# Patient Record
Sex: MALE | Race: WHITE | NOT HISPANIC OR LATINO | Employment: UNEMPLOYED | URBAN - METROPOLITAN AREA
[De-identification: names, ages, dates, MRNs, and addresses within clinical notes are randomized per-mention and may not be internally consistent; named-entity substitution may affect disease eponyms.]

---

## 2019-10-21 ENCOUNTER — HOSPITAL ENCOUNTER (EMERGENCY)
Facility: HOSPITAL | Age: 29
Discharge: HOME | End: 2019-10-21
Attending: EMERGENCY MEDICINE | Admitting: EMERGENCY MEDICINE
Payer: COMMERCIAL

## 2019-10-21 VITALS
HEART RATE: 100 BPM | TEMPERATURE: 97.9 F | SYSTOLIC BLOOD PRESSURE: 122 MMHG | OXYGEN SATURATION: 98 % | RESPIRATION RATE: 22 BRPM | DIASTOLIC BLOOD PRESSURE: 60 MMHG

## 2019-10-21 DIAGNOSIS — K08.89 TOOTHACHE: ICD-10-CM

## 2019-10-21 DIAGNOSIS — K04.7 DENTAL ABSCESS: Primary | ICD-10-CM

## 2019-10-21 PROCEDURE — 99282 EMERGENCY DEPT VISIT SF MDM: CPT

## 2019-10-21 PROCEDURE — 99284 EMERGENCY DEPT VISIT MOD MDM: CPT | Performed by: EMERGENCY MEDICINE

## 2019-10-21 PROCEDURE — 96372 THER/PROPH/DIAG INJ SC/IM: CPT

## 2019-10-21 RX ORDER — SENNOSIDES 8.6 MG
650 CAPSULE ORAL EVERY 6 HOURS PRN
Qty: 30 TABLET | Refills: 0 | Status: SHIPPED | OUTPATIENT
Start: 2019-10-21

## 2019-10-21 RX ORDER — TRAMADOL HYDROCHLORIDE 50 MG/1
50 TABLET ORAL EVERY 8 HOURS PRN
Qty: 5 TABLET | Refills: 0 | Status: SHIPPED | OUTPATIENT
Start: 2019-10-21 | End: 2019-10-23

## 2019-10-21 RX ORDER — KETOROLAC TROMETHAMINE 30 MG/ML
30 INJECTION, SOLUTION INTRAMUSCULAR; INTRAVENOUS ONCE
Status: COMPLETED | OUTPATIENT
Start: 2019-10-21 | End: 2019-10-21

## 2019-10-21 RX ORDER — PAROXETINE HYDROCHLORIDE 20 MG/1
20 TABLET, FILM COATED ORAL DAILY
COMMUNITY

## 2019-10-21 RX ORDER — ACETAMINOPHEN 325 MG/1
650 TABLET ORAL ONCE
Status: COMPLETED | OUTPATIENT
Start: 2019-10-21 | End: 2019-10-21

## 2019-10-21 RX ORDER — PENICILLIN V POTASSIUM 250 MG/1
500 TABLET ORAL ONCE
Status: COMPLETED | OUTPATIENT
Start: 2019-10-21 | End: 2019-10-21

## 2019-10-21 RX ORDER — PENICILLIN V POTASSIUM 500 MG/1
500 TABLET ORAL 4 TIMES DAILY
Qty: 40 TABLET | Refills: 0 | Status: SHIPPED | OUTPATIENT
Start: 2019-10-21 | End: 2019-10-31

## 2019-10-21 RX ORDER — IBUPROFEN 800 MG/1
800 TABLET ORAL EVERY 8 HOURS PRN
Qty: 30 TABLET | Refills: 0 | Status: SHIPPED | OUTPATIENT
Start: 2019-10-21 | End: 2019-10-31

## 2019-10-21 RX ORDER — TRAMADOL HYDROCHLORIDE 50 MG/1
50 TABLET ORAL ONCE
Status: COMPLETED | OUTPATIENT
Start: 2019-10-21 | End: 2019-10-21

## 2019-10-21 RX ADMIN — TRAMADOL HYDROCHLORIDE 50 MG: 50 TABLET, FILM COATED ORAL at 02:24

## 2019-10-21 RX ADMIN — KETOROLAC TROMETHAMINE 30 MG: 30 INJECTION, SOLUTION INTRAMUSCULAR at 02:25

## 2019-10-21 RX ADMIN — ACETAMINOPHEN 650 MG: 325 TABLET ORAL at 02:24

## 2019-10-21 RX ADMIN — PENICILLIN V POTASSIUM 500 MG: 250 TABLET, FILM COATED ORAL at 02:23

## 2019-10-21 NOTE — ED PROVIDER NOTES
History  Chief Complaint   Patient presents with    Dental Pain     right upper molar pain  Last dose of tylenol at 2000  33 yo male with R upper molar dental pain x past 4 days  Pt states he knows he has dental caries but also felt tooth crack Thursday night and since that time has had worsening pain  Obvious dental abscess above R upper molar tooth #2  History provided by:  Patient   used: No    Dental Pain   Location:  Upper  Upper teeth location:  2/RU 2nd molar  Quality:  Aching and constant  Severity:  Severe  Onset quality:  Gradual  Duration:  4 days  Timing:  Constant  Progression:  Unchanged  Chronicity:  New  Context: poor dentition and trauma (cracked tooth while eating something 4 days ago)    Relieved by:  Nothing  Worsened by:  Pressure and touching  Ineffective treatments:  Acetaminophen  Associated symptoms: facial pain and gum swelling ("I feel a bump on it")    Associated symptoms: no congestion, no drooling, no facial swelling, no fever, no headaches, no neck pain and no trismus    Risk factors: lack of dental care        Prior to Admission Medications   Prescriptions Last Dose Informant Patient Reported? Taking? PARoxetine (PAXIL) 20 mg tablet   Yes Yes   Sig: Take 20 mg by mouth daily      Facility-Administered Medications: None       History reviewed  No pertinent past medical history  History reviewed  No pertinent surgical history  History reviewed  No pertinent family history  I have reviewed and agree with the history as documented  Social History     Tobacco Use    Smoking status: Current Every Day Smoker     Packs/day: 0 50    Smokeless tobacco: Never Used   Substance Use Topics    Alcohol use: Not Currently    Drug use: Not Currently        Review of Systems   Constitutional: Negative for chills and fever  HENT: Positive for dental problem  Negative for congestion, drooling, facial swelling and sore throat      Eyes: Negative for visual disturbance  Respiratory: Negative for shortness of breath and wheezing  Cardiovascular: Negative for chest pain and palpitations  Gastrointestinal: Negative for abdominal pain, diarrhea, nausea and vomiting  Genitourinary: Negative for dysuria  Musculoskeletal: Negative for neck pain and neck stiffness  Skin: Negative for pallor and rash  Neurological: Negative for headaches  Psychiatric/Behavioral: Negative for confusion  All other systems reviewed and are negative  Physical Exam  Physical Exam   Constitutional: He is oriented to person, place, and time  He appears well-developed and well-nourished  He appears distressed (uncomfortable)  HENT:   Head: Normocephalic and atraumatic  Mouth/Throat: Oropharynx is clear and moist        Eyes: Pupils are equal, round, and reactive to light  EOM are normal    Neck: Normal range of motion  Neck supple  Cardiovascular: Normal rate and regular rhythm  No murmur heard  Pulmonary/Chest: Effort normal and breath sounds normal    Abdominal: Soft  Bowel sounds are normal    Musculoskeletal: Normal range of motion  He exhibits no edema  Neurological: He is alert and oriented to person, place, and time  Skin: Skin is warm  No rash noted  No pallor  Psychiatric: He has a normal mood and affect  His behavior is normal    Nursing note and vitals reviewed        Vital Signs  ED Triage Vitals [10/21/19 0205]   Temperature Pulse Respirations Blood Pressure SpO2   97 9 °F (36 6 °C) 100 22 122/60 98 %      Temp Source Heart Rate Source Patient Position - Orthostatic VS BP Location FiO2 (%)   Temporal Monitor Sitting Right arm --      Pain Score       8           Vitals:    10/21/19 0205   BP: 122/60   Pulse: 100   Patient Position - Orthostatic VS: Sitting         Visual Acuity      ED Medications  Medications   ketorolac (TORADOL) injection 30 mg (30 mg Intramuscular Given 10/21/19 0225)   penicillin V potassium (VEETID) tablet 500 mg (500 mg Oral Given 10/21/19 0223)   acetaminophen (TYLENOL) tablet 650 mg (650 mg Oral Given 10/21/19 0224)   traMADol (ULTRAM) tablet 50 mg (50 mg Oral Given 10/21/19 0224)       Diagnostic Studies  Results Reviewed     None                 No orders to display              Procedures  Procedures       ED Course  ED Course as of Oct 21 0235   Mon Oct 21, 2019   0202 Pt seen and examined  33 yo male with R upper molar dental pain x past 4 days  Pt states he knows he has dental caries but also felt tooth crack Thursday night and since that time has had worsening pain  Obvious dental abscess above R upper molar tooth #2  Will give IM toradol, tylenol, ultram and PCN and have pt f/u with dental clinic  MDM    Disposition  Final diagnoses:   Dental abscess   Toothache     Time reflects when diagnosis was documented in both MDM as applicable and the Disposition within this note     Time User Action Codes Description Comment    10/21/2019  2:14 AM Lisa CRUZ Add [K04 7] Dental abscess     10/21/2019  2:14 AM Janis Mendez Add [K08 89] Toothache       ED Disposition     ED Disposition Condition Date/Time Comment    Discharge Stable Mon Oct 21, 2019  2:14 AM Tiffany Oneill discharge to home/self care              Follow-up Information     Follow up With Specialties Details Why [address]  Schedule an appointment as soon as possible for a visit   [place]  Schedule an appointment as soon as possible for a visit   [address] [place] [address]  [phone]          Discharge Medication List as of 10/21/2019  2:16 AM      START taking these medications    Details   acetaminophen (TYLENOL) 650 mg CR tablet Take 1 tablet (650 mg total) by mouth every 6 (six) hours as needed for mild pain or moderate pain, Starting Mon 10/21/2019, Print      ibuprofen (MOTRIN) 800 mg tablet Take 1 tablet (800 mg total) by mouth every 8 (eight) hours as needed for mild pain or moderate pain for up to 10 days, Starting Mon 10/21/2019, Until Thu 10/31/2019, Print      penicillin V potassium (VEETID) 500 mg tablet Take 1 tablet (500 mg total) by mouth 4 (four) times a day for 10 days, Starting Mon 10/21/2019, Until Thu 10/31/2019, Print      traMADol (ULTRAM) 50 mg tablet Take 1 tablet (50 mg total) by mouth every 8 (eight) hours as needed for severe pain for up to 2 days, Starting Mon 10/21/2019, Until Wed 10/23/2019, Print         CONTINUE these medications which have NOT CHANGED    Details   PARoxetine (PAXIL) 20 mg tablet Take 20 mg by mouth daily, Historical Med           No discharge procedures on file      ED Provider  Electronically Signed by           Jersey Segura DO  10/21/19 4821

## 2021-03-22 ENCOUNTER — OFFICE VISIT (OUTPATIENT)
Dept: URGENT CARE | Facility: CLINIC | Age: 31
End: 2021-03-22
Payer: COMMERCIAL

## 2021-03-22 VITALS
HEIGHT: 68 IN | RESPIRATION RATE: 18 BRPM | OXYGEN SATURATION: 98 % | HEART RATE: 79 BPM | WEIGHT: 170 LBS | BODY MASS INDEX: 25.76 KG/M2

## 2021-03-22 DIAGNOSIS — R10.84 GENERALIZED ABDOMINAL PAIN: Primary | ICD-10-CM

## 2021-03-22 DIAGNOSIS — T78.40XA ALLERGY, INITIAL ENCOUNTER: ICD-10-CM

## 2021-03-22 PROCEDURE — 99213 OFFICE O/P EST LOW 20 MIN: CPT | Performed by: NURSE PRACTITIONER

## 2021-03-22 RX ORDER — ALBUTEROL SULFATE 90 UG/1
AEROSOL, METERED RESPIRATORY (INHALATION)
COMMUNITY
Start: 2020-11-20

## 2021-03-22 RX ORDER — TIZANIDINE HYDROCHLORIDE 4 MG/1
4 CAPSULE, GELATIN COATED ORAL 3 TIMES DAILY
COMMUNITY

## 2021-03-22 NOTE — PATIENT INSTRUCTIONS
Shower at [facility] [employer]  F/u with pcp    Allergies   AMBULATORY CARE:   Allergies  are an immune system reaction to a substance called an allergen  Your immune system sees the allergen as harmful and attacks it  Common signs and symptoms include the following:   · Mild symptoms  include sneezing and a runny, itchy, or stuffy nose  You may also have swollen, watery, or itchy eyes, or skin itching  You may have swelling or pain where an insect bit or stung you  · Anaphylaxis symptoms  include trouble breathing or swallowing, a rash or hives, or severe swelling  You may also have a cough, wheezing, or feel lightheaded or dizzy  Anaphylaxis is a sudden, life-threatening reaction that needs immediate treatment  Call 911 for signs or symptoms of anaphylaxis,  such as trouble breathing, swelling in your mouth or throat, or wheezing  You may also have itching, a rash, hives, or feel like you are going to faint  Seek care immediately if:   · You have tingling in your hands or feet  · Your skin is red or flushed  Contact your healthcare provider if:   · You have questions or concerns about your condition or care  Steps to take for signs or symptoms of anaphylaxis:   · Immediately  give 1 shot of epinephrine only into the outer thigh muscle  · Leave the shot in place  as directed  Your healthcare provider may recommend you leave it in place for up to 10 seconds before you remove it  This helps make sure all of the epinephrine is delivered  · Call 911 and go to the emergency department,  even if the shot improved symptoms  Do not drive yourself  Bring the used epinephrine shot with you  Treatment for allergies  may include any of the following:  · Antihistamines  help decrease itching, sneezing, and swelling  You may take them as a pill or use drops in your nose or eyes  · Decongestants  help your nose feel less stuffy  · Steroids  decrease swelling and redness       · Topical treatments  help decrease itching or swelling  You also may be given nasal sprays or eyedrops  · Epinephrine  is medicine used to treat severe allergic reactions such as anaphylaxis  · Desensitization  gets your body used to allergens you cannot avoid  Your healthcare provider will give you a shot that contains a small amount of an allergen  He or she will treat any allergic reaction you have  Your provider will give you more of the allergen a little at a time until your body gets used to it  Your reaction to the allergen may be less serious after this treatment  Your healthcare provider will tell you how long to get the shots  Safety precautions to take if you are at risk for anaphylaxis:   · Keep 2 shots of epinephrine with you at all times  You may need a second shot, because epinephrine only works for about 20 minutes and symptoms may return  Your healthcare provider can show you and family members how to give the shot  Check the expiration date every month and replace it before it expires  · Create an action plan  Your healthcare provider can help you create a written plan that explains the allergy and an emergency plan to treat a reaction  The plan explains when to give a second epinephrine shot if symptoms return or do not improve after the first  Give copies of the action plan and emergency instructions to family members and work staff  Show them how to give a shot of epinephrine  · Be careful when you exercise  If you have had exercise-induced anaphylaxis, do not exercise right after you eat  Stop exercising right away if you start to develop any signs or symptoms of anaphylaxis  You may first feel tired, warm, or have itchy skin  Hives, swelling, and severe breathing problems may develop if you continue to exercise  · Carry medical alert identification  Wear medical alert jewelry or carry a card that explains the allergy  Ask your healthcare provider where to get these items  · Inform all healthcare providers of the allergy  This includes dentists, nurses, doctors, and surgeons  Manage allergies:   · Use nasal rinses as directed  Rinse with a saline solution daily  This will help clear allergens out of your nose  Use distilled water if possible  You can also boil tap water and let it cool before you use it  Do not use tap water that has not been boiled  · Do not smoke  Allergy symptoms may decrease if you are not around smoke  Nicotine and other chemicals in cigarettes and cigars can cause lung damage  Ask your healthcare provider for information if you currently smoke and need help to quit  E-cigarettes or smokeless tobacco still contain nicotine  Talk to your healthcare provider before you use these products  Prevent an allergic reaction:   · Do not go outside when pollen counts are high if you have seasonal allergies  Your symptoms may be better if you go outside only in the morning or evening  Use your air conditioner, and change air filters often  · Avoid dust, fur, and mold  Dust and vacuum your home often  You may want to wear a mask when you vacuum  Keep pets in certain rooms, and bathe them often  Use a dehumidifier (machine that decreases moisture) to help prevent mold  · Do not use products that contain latex if you have a latex allergy  Use nonlatex gloves if you work in healthcare or in food preparation  Always tell healthcare providers about a latex allergy  · Avoid areas that attract insects if you have an insect bite or sting allergy  Areas include trash cans, gardens, and picnics  Do not wear bright clothing or strong scents when you will be outside  · Prevent an allergic reaction caused by food  You may have a reaction if your food is not prepared safely  For example, you could be served food that touched your trigger food during preparation  This is called cross-contamination  Kitchen tools can also cause cross-contamination   You may also eat baked foods that contain a trigger food you do not know about  Ask if the food contains your trigger food before you handle or eat it  Follow up with your healthcare provider as directed:  Write down your questions so you remember to ask them during your visits  When you have an allergic reaction, write down everything you were exposed to in the 2 hours before the reaction  Take that information to your next visit  © Copyright [address] Information is for End User's use only and may not be sold, redistributed or otherwise used for commercial purposes  All illustrations and images included in CareNotes® are the copyrighted property of A D A M , Inc  or [facility] Letha Queen   The above information is an  only  It is not intended as medical advice for individual conditions or treatments  Talk to your doctor, nurse or pharmacist before following any medical regimen to see if it is safe and effective for you

## 2021-03-22 NOTE — LETTER
March 22, 2021     Patient: Caterina Bearden   YOB: 1990   Date of Visit: 3/22/2021       To Whom It May Concern: It is my medical opinion that Caterina Bearden may return to full duty immediately with no restrictions  If you have any questions or concerns, please don't hesitate to call           Sincerely,        ROMMEL Anguiano    CC: No Recipients

## 2021-03-22 NOTE — PROGRESS NOTES
[facility]        NAME: Sada Sandhu is a 32 y o  male  : 1990    MRN: [id number]  DATE: 2021  TIME: 12:47 PM    Assessment and Plan   Generalized abdominal pain [R10 84]  1  Generalized abdominal pain     2  Allergy, initial encounter       Normal exam in office today   Recommend start allegra d   Shower at night   F/u with pcp   Ok to rtn to work - note given  Patient Instructions     Follow up with PCP in 3-5 days  Proceed to  ER if symptoms worsen  Chief Complaint     Chief Complaint   Patient presents with    Abdominal Pain     One week with abd pain  Patient having no problem with BM's  Patient also c/o nausea         History of Present Illness   Sada Sandhu presents to the clinic c/o    States has been waking up in the morning with bilateral eyes crusted shut, congestion, and abd pain  Symptoms resolve spontaneously mid morning  Has been taking a generic otc 24 hour antihistamine from [employer] but doesn't know what it is generic for  Has been calling out of work due to abd pain   Denies any change in bowel habits or food consumption   Has not called pcp       Review of Systems   Review of Systems   All other systems reviewed and are negative          Current Medications     Long-Term Medications   Medication Sig Dispense Refill    diphenhydrAMINE (SOMINEX) 25 MG tablet Take 50 mg by mouth daily as needed      PARoxetine (PAXIL) 20 mg tablet Take 20 mg by mouth daily      TiZANidine (ZANAFLEX) 4 MG capsule Take 4 mg by mouth 3 (three) times a day      ibuprofen (MOTRIN) 800 mg tablet Take 1 tablet (800 mg total) by mouth every 8 (eight) hours as needed for mild pain or moderate pain for up to 10 days 30 tablet 0       Current Allergies     Allergies as of 2021 - Reviewed 2021   Allergen Reaction Noted    Valproic acid GI Intolerance 2019    Latex Other (See Comments) 2011    Tciwl-cjrtg-sdgvien-pramoxine Swelling 2017    Neomycin-bacitracin zn-polymyx Rash 11/01/2011            The following portions of the patient's history were reviewed and updated as appropriate: allergies, current medications, past family history, past medical history, past social history, past surgical history and problem list     Objective   Pulse 79   Resp 18   Ht 5' 8" (1 727 m)   Wt 77 1 kg (170 lb)   SpO2 98%   BMI 25 85 kg/m²        Physical Exam     Physical Exam  Constitutional:       Appearance: He is well-developed  HENT:      Head: Normocephalic and atraumatic  Eyes:      General: Lids are normal       Conjunctiva/sclera: Conjunctivae normal       Pupils: Pupils are equal, round, and reactive to light  Cardiovascular:      Rate and Rhythm: Normal rate and regular rhythm  Heart sounds: Normal heart sounds, S1 normal and S2 normal    Pulmonary:      Effort: Pulmonary effort is normal       Breath sounds: Normal breath sounds  Skin:     General: Skin is warm and dry  Psychiatric:         Speech: Speech normal          Behavior: Behavior normal          Thought Content:  Thought content normal          Judgment: Judgment normal

## 2024-01-03 ENCOUNTER — OFFICE VISIT (OUTPATIENT)
Dept: URGENT CARE | Facility: CLINIC | Age: 34
End: 2024-01-03
Payer: COMMERCIAL

## 2024-01-03 VITALS
DIASTOLIC BLOOD PRESSURE: 80 MMHG | TEMPERATURE: 98.7 F | RESPIRATION RATE: 20 BRPM | SYSTOLIC BLOOD PRESSURE: 120 MMHG | OXYGEN SATURATION: 100 % | HEART RATE: 80 BPM

## 2024-01-03 DIAGNOSIS — S61.451A DOG BITE OF RIGHT HAND, INITIAL ENCOUNTER: Primary | ICD-10-CM
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
## 2024-01-03 DIAGNOSIS — M25.531 PAIN IN RIGHT WRIST: ICD-10-CM

## 2024-01-03 PROCEDURE — 99213 OFFICE O/P EST LOW 20 MIN: CPT | Performed by: PHYSICIAN ASSISTANT

## 2024-01-03 PROCEDURE — 90471 IMMUNIZATION ADMIN: CPT | Performed by: PHYSICIAN ASSISTANT

## 2024-01-03 PROCEDURE — 90715 TDAP VACCINE 7 YRS/> IM: CPT

## 2024-01-03 RX ORDER — ESCITALOPRAM OXALATE 20 MG/1
20 TABLET ORAL DAILY
COMMUNITY
Start: 2023-12-04

## 2024-01-03 RX ORDER — PREGABALIN 75 MG/1
75 CAPSULE ORAL 3 TIMES DAILY
COMMUNITY

## 2024-01-03 RX ORDER — FEXOFENADINE HCL 60 MG/1
60 TABLET, FILM COATED ORAL DAILY
COMMUNITY

## 2024-01-03 NOTE — PROGRESS NOTES
[facility]    NAME: Jeff Lopez is a 33 y.o. male  : 1990    MRN: [id number]  DATE: January 3, 2024  TIME: 7:13 PM    Assessment and Plan   Dog bite of right hand, initial encounter [S61.451A, W54.0XXA]  1. Dog bite of right hand, initial encounter  XR hand 3+ vw right    XR wrist 3+ vw right    TDAP VACCINE GREATER THAN OR EQUAL TO 8YO IM    Compression bandages      2. Pain in right wrist  Compression bandages        X-ray right hand: No acute bony changes.  Await radiologist final test results.    X-ray right wrist: No acute bony changes.  Await radiologist final test results.    Work note given.    Nurse administered Tdap vaccine.  Nurse cleansed wound and applied clean dressing and Ace wrap to patient's right hand.    County paperwork completed.    Patient Instructions     Patient Instructions   Wash hand daily with plain soap and water.  Do not use antibiotic ointment on wounds.  Cover with clean dressings.  Change dressing at least once a day.  Cold compresses 10 to 15 minutes  every 2-3 hours while awake to help control pain.  After 48 hours may do warm compresses.  Elevate hand wrist to help control pain.    Ace wrap for compression, support and comfort.  Due for next 1 to 2 days as needed.  Do not wear to sleep.    Contact your primary care provider offices soon as possible to arrange follow-up for the next 3 to 5 days for reevaluation.    If you would develop any signs of infection, which would typically occur after 48 to 72 hours, seek further evaluation.    Ibuprofen as needed.      Chief Complaint     Chief Complaint   Patient presents with    Dog Bite     Pt reports bitten by family dog earlier today.        History of Present Illness   Jeff Lopez presents to the clinic c/o  33-year-old right-hand-dominant male comes in with wounds on right hand at base of thumb and pain swelling right hand and pain wrist after being bitten by family dog earlier today.  She reports that he was walking in  the front door of his house, which is a new home, and dog got startled and lunged for him and grabbed his right hand.  Dog pulled him further into the room and patient was also pulling away from dog.  Dog's rabies vaccination up-to-date.    Patient's last tetanus was 2016.      Modifying factors: Moving makes the pain worse.  Hard to .      Dog Bite   The incident occurred just prior to arrival. The incident occurred at home. There is an injury to the Right hand. There is an injury to the Right thumb. He is Right-handed. His tetanus status is out of date.       Review of Systems   Review of Systems   Constitutional:  Positive for activity change. Negative for appetite change, chills and fever.   Musculoskeletal:  Positive for arthralgias, joint swelling and myalgias.   Skin:  Positive for color change and wound.       Current Medications     Long-Term Medications   Medication Sig Dispense Refill    escitalopram (LEXAPRO) 20 mg tablet Take 20 mg by mouth daily      pregabalin (LYRICA) 75 mg capsule Take 75 mg by mouth 3 (three) times a day      diphenhydrAMINE (SOMINEX) 25 MG tablet Take 50 mg by mouth daily as needed (Patient not taking: Reported on 1/3/2024)      fexofenadine (ALLEGRA) 60 MG tablet Take 60 mg by mouth daily (Patient not taking: Reported on 1/3/2024)      ibuprofen (MOTRIN) 800 mg tablet Take 1 tablet (800 mg total) by mouth every 8 (eight) hours as needed for mild pain or moderate pain for up to 10 days 30 tablet 0    PARoxetine (PAXIL) 20 mg tablet Take 20 mg by mouth daily (Patient not taking: Reported on 1/3/2024)      TiZANidine (ZANAFLEX) 4 MG capsule Take 4 mg by mouth 3 (three) times a day (Patient not taking: Reported on 1/3/2024)         Current Allergies     Allergies as of 01/03/2024 - Reviewed 01/03/2024   Allergen Reaction Noted    Methocarbamol Diarrhea 03/01/2022    Valproic acid GI Intolerance 02/06/2019    Latex Other (See Comments) 11/01/2011    Cdxny-casqc-kowvdgg-pramoxine  Swelling 09/28/2017    Neomycin-bacitracin zn-polymyx Rash 11/01/2011          The following portions of the patient's history were reviewed and updated as appropriate: allergies, current medications, past family history, past medical history, past social history, past surgical history and problem list.  No past medical history on file.  No past surgical history on file.  No family history on file.    Objective   /80 (BP Location: Left arm, Patient Position: Sitting, Cuff Size: Standard)   Pulse 80   Temp 98.7 °F (37.1 °C) (Tympanic)   Resp 20   SpO2 100%   No LMP for male patient.       Physical Exam     Physical Exam  Vitals and nursing note reviewed.   Constitutional:       General: He is not in acute distress.     Appearance: He is well-developed. He is not ill-appearing, toxic-appearing or diaphoretic.      Comments: Patient holding right hand in guarded position.  Mom accompanies.   Cardiovascular:      Rate and Rhythm: Normal rate and regular rhythm.   Pulmonary:      Effort: Pulmonary effort is normal.   Musculoskeletal:         General: Swelling, tenderness and signs of injury present. No deformity.      Right wrist: Tenderness, bony tenderness and snuff box tenderness present. No swelling, deformity, effusion or crepitus. Decreased range of motion. Normal pulse.      Right hand: Swelling, laceration, tenderness and bony tenderness present. No deformity. Decreased range of motion. Decreased strength.   Skin:     General: Skin is warm.      Capillary Refill: Capillary refill takes less than 2 seconds.      Findings: Abrasion, bruising, signs of injury, laceration and wound present. No ecchymosis or erythema.      Comments: Superficial bite cale webspace right hand between thumb and index approximately 0.7 cm x 0.1 cm.  Small amount of blood and wound.  Controlled with direct pressure.  Approximately 4 linear  abrasions also noted just proximal to bite cale.  Contusion noted around superficial  laceration.   Neurological:      Mental Status: He is alert and oriented to person, place, and time.   Psychiatric:         Mood and Affect: Mood normal.         Behavior: Behavior normal.

## 2024-01-03 NOTE — LETTER
January 3, 2024     Patient: Jeff Lopez   YOB: 1990   Date of Visit: 1/3/2024       To Whom It May Concern:    Patient seen in office this evening for acute medical ailment.  May attempt return to work in the next 4 to 5 days as tolerated.       Sincerely,        Sharda Christine PA-C    CC: No Recipients

## 2024-01-03 NOTE — PATIENT INSTRUCTIONS
Wash hand daily with plain soap and water.  Do not use antibiotic ointment on wounds.  Cover with clean dressings.  Change dressing at least once a day.  Cold compresses 10 to 15 minutes  every 2-3 hours while awake to help control pain.  After 48 hours may do warm compresses.  Elevate hand wrist to help control pain.    Ace wrap for compression, support and comfort.  Due for next 1 to 2 days as needed.  Do not wear to sleep.    Contact your primary care provider offices soon as possible to arrange follow-up for the next 3 to 5 days for reevaluation.    If you would develop any signs of infection, which would typically occur after 48 to 72 hours, seek further evaluation.    Ibuprofen as needed.